# Patient Record
Sex: MALE | Race: BLACK OR AFRICAN AMERICAN
[De-identification: names, ages, dates, MRNs, and addresses within clinical notes are randomized per-mention and may not be internally consistent; named-entity substitution may affect disease eponyms.]

---

## 2021-02-04 ENCOUNTER — HOSPITAL ENCOUNTER (OUTPATIENT)
Dept: HOSPITAL 63 - RAD | Age: 66
End: 2021-02-04
Payer: COMMERCIAL

## 2021-02-04 DIAGNOSIS — M25.511: Primary | ICD-10-CM

## 2021-02-04 PROCEDURE — 73030 X-RAY EXAM OF SHOULDER: CPT

## 2021-02-04 NOTE — RAD
3 views of right shoulder without comparison for injury, pain.



FINDINGS: There is no fracture or acute osseous abnormality identified. No radiopaque foreign bodies.
 No pathologic calcifications. Mild osteoarthritis of the acromioclavicular joint. Old healed appeari
ng injury to the right posterior sixth rib.



IMPRESSION:

1. Right posterior sixth rib injury which appears healed. No acute osseous abnormality of the shoulde
r.



Electronically signed by: Noe Gan MD (2/4/2021 2:26 PM) [id number]

## 2021-02-18 ENCOUNTER — HOSPITAL ENCOUNTER (OUTPATIENT)
Dept: HOSPITAL 63 - DXRAD | Age: 66
End: 2021-02-18
Attending: SPECIALIST
Payer: COMMERCIAL

## 2021-02-18 DIAGNOSIS — J44.9: ICD-10-CM

## 2021-02-18 DIAGNOSIS — R91.1: Primary | ICD-10-CM

## 2021-02-18 PROCEDURE — 71046 X-RAY EXAM CHEST 2 VIEWS: CPT

## 2021-02-18 NOTE — RAD
EXAM: CHEST 2 VIEWS.



HISTORY: Chest pain, dysphagia.



COMPARISON: None.



FINDINGS: Frontal and lateral views of the chest are obtained.



A density projecting over the right upper lobe may reflect an old healed right anterior third rib fra
cture. A 9 mm dense nodule projecting over the left base may represent a calcified granuloma but it i
s not well-seen on the lateral projection. There are no confluent infiltrates. The hemidiaphragms are
 mildly flattened. There is no pneumothorax or pleural effusion. The heart is not enlarged.



IMPRESSION:

1. A 9 mm left basilar nodule likely represents a calcified granuloma. Comparison with older studies 
is recommended to confirm long-term stability. CT could further evaluate if the diagnosis remains unc
lear.

2. Correlate for chronic obstructive pulmonary disease.



Electronically signed by: ELISABET Wellington MD (2/18/2021 4:39 PM) [id number]

## 2021-02-26 ENCOUNTER — HOSPITAL ENCOUNTER (OUTPATIENT)
Dept: HOSPITAL 63 - CT | Age: 66
End: 2021-02-26
Attending: SPECIALIST
Payer: COMMERCIAL

## 2021-02-26 DIAGNOSIS — J84.10: Primary | ICD-10-CM

## 2021-02-26 DIAGNOSIS — M43.8X5: ICD-10-CM

## 2021-02-26 DIAGNOSIS — N28.89: ICD-10-CM

## 2021-02-26 DIAGNOSIS — Q25.46: ICD-10-CM

## 2021-02-26 DIAGNOSIS — R91.1: ICD-10-CM

## 2021-02-26 DIAGNOSIS — J43.9: ICD-10-CM

## 2021-02-26 DIAGNOSIS — I70.0: ICD-10-CM

## 2021-02-26 PROCEDURE — 71250 CT THORAX DX C-: CPT

## 2021-02-26 NOTE — RAD
CT scan of the chest without contrast 2/26/2021



CLINICAL HISTORY: Nodule seen within the left lower lobe on chest radiograph.



TECHNIQUE: Unenhanced, contiguous, 3 mm axial sections were obtained through the chest and upper abdo
men.



One or more of the following individualized dose reduction techniques were utilized for this study:



1. Automated exposure control.

2. Adjustment of the mA and/or kV according to patient size.

3. Use of iterative reconstruction technique.





FINDINGS: Comparison is made to PA and lateral chest radiographs dated 2/18/2021.



Atherosclerotic calcification of the thoracic aorta and its branches is seen. The thoracic aorta is t
ortuous but tapers normally. A calcified left hilar lymph node is seen which measures 8 mm. No hilar,
 mediastinal or axillary lymphadenopathy is seen. The heart is normal in size.



Mild bullous emphysematous changes are seen involving both lungs. A 1.6 cm calcified granuloma is see
n involving the left lower lobe. This corresponds to the abnormality seen on the patient's chest radi
ograph. No noncalcified pulmonary nodule is seen. No area of consolidation is seen. No pneumothorax o
r pleural effusion is noted.



Rounded low-attenuation lesions are seen involving both kidneys which measure 1 to 4.4 cm in size. Th
barrie likely represent cysts. No further imaging evaluation is recommended. Atherosclerotic calcificati
on abdominal aorta is seen. Minimal S-shaped curvature of the thoracolumbar spine is noted. Degenerat
keiry changes are seen involving the lower thoracic and visualized lumbar spine.



IMPRESSION: 1.6 cm calcified granuloma is seen involving left lower lobe which corresponds to the abn
ormality seen on the patient's chest radiograph. No acute abnormality is seen.



Electronically signed by: Damon Urbina MD (2/26/2021 5:03 PM) [id number]

## 2021-06-18 ENCOUNTER — HOSPITAL ENCOUNTER (OUTPATIENT)
Dept: HOSPITAL 63 - RAD | Age: 66
End: 2021-06-18
Attending: SPECIALIST
Payer: COMMERCIAL

## 2021-06-18 DIAGNOSIS — M18.11: Primary | ICD-10-CM

## 2021-06-18 PROCEDURE — 73140 X-RAY EXAM OF FINGER(S): CPT

## 2021-06-18 NOTE — RAD
XR FINGER(S)_RIGHT 2+VIEWS



History: Reason: 1ST DIGIT, TRIGGER THUMB / Spl. Instructions:  / History: 



Technique: AP view the hand and 2 additional views of the first digit.



Comparison: None.



Findings:

Normal alignment. No fracture. Advanced first carpal metacarpal DJD.



Impression: 

1.  Advanced first carpometacarpal DJD.



Electronically signed by: Arsh Bundy DO (6/18/2021 4:39 PM) [id number]